# Patient Record
Sex: FEMALE | HISPANIC OR LATINO | URBAN - METROPOLITAN AREA
[De-identification: names, ages, dates, MRNs, and addresses within clinical notes are randomized per-mention and may not be internally consistent; named-entity substitution may affect disease eponyms.]

---

## 2023-11-08 ENCOUNTER — OFFICE VISIT (OUTPATIENT)
Dept: URBAN - METROPOLITAN AREA CLINIC 92 | Facility: CLINIC | Age: 48
End: 2023-11-08
Payer: COMMERCIAL

## 2023-11-08 ENCOUNTER — DASHBOARD ENCOUNTERS (OUTPATIENT)
Age: 48
End: 2023-11-08

## 2023-11-08 VITALS
HEIGHT: 59 IN | SYSTOLIC BLOOD PRESSURE: 107 MMHG | HEART RATE: 81 BPM | TEMPERATURE: 96.1 F | DIASTOLIC BLOOD PRESSURE: 65 MMHG | WEIGHT: 108 LBS | BODY MASS INDEX: 21.77 KG/M2

## 2023-11-08 DIAGNOSIS — Z12.11 COLON CANCER SCREENING: ICD-10-CM

## 2023-11-08 DIAGNOSIS — A04.8 HELICOBACTER PYLORI (H. PYLORI) INFECTION: ICD-10-CM

## 2023-11-08 PROCEDURE — 99204 OFFICE O/P NEW MOD 45 MIN: CPT | Performed by: INTERNAL MEDICINE

## 2023-11-08 PROCEDURE — 99244 OFF/OP CNSLTJ NEW/EST MOD 40: CPT | Performed by: INTERNAL MEDICINE

## 2023-11-08 RX ORDER — METFORMIN HCL 1000 MG/1
TAKE ONE TABLET BY MOUTH TWICE A DAY TABLET, FILM COATED ORAL
Qty: 180 UNSPECIFIED | Refills: 0 | Status: ACTIVE | COMMUNITY

## 2023-11-08 RX ORDER — MISOPROSTOL 200 UG/1
TAKE TWO TABLETS BY MOUTH ONE TIME DAILY FOR ONE DAY AS DIRECTED TABLET ORAL
Qty: 2 UNSPECIFIED | Refills: 0 | Status: ACTIVE | COMMUNITY

## 2023-11-08 RX ORDER — LISINOPRIL 10 MG/1
TAKE ONE TABLET BY MOUTH EVERY MORNING TABLET ORAL
Qty: 90 UNSPECIFIED | Refills: 0 | Status: ACTIVE | COMMUNITY

## 2023-11-08 RX ORDER — OMEPRAZOLE 20 MG/1
TAKE ONE CAPSULE BY MOUTH EVERY MORNING 30 MINUTES BEFORE BREAKFAST CAPSULE, DELAYED RELEASE ORAL
Qty: 90 UNSPECIFIED | Refills: 0 | Status: ACTIVE | COMMUNITY

## 2023-11-08 RX ORDER — ERGOCALCIFEROL 1.25 MG/1
TAKE ONE CAPSULE BY MOUTH EVERY WEEK CAPSULE, LIQUID FILLED ORAL
Qty: 13 UNSPECIFIED | Refills: 0 | Status: ACTIVE | COMMUNITY

## 2023-11-08 RX ORDER — DULAGLUTIDE 1.5 MG/.5ML
INJECT 0.5 ML UNDER THE SKIN EVERY A WEEK INJECTION, SOLUTION SUBCUTANEOUS
Qty: 2 MILLILITER | Refills: 0 | Status: ACTIVE | COMMUNITY

## 2023-11-08 RX ORDER — ATORVASTATIN CALCIUM, FILM COATED 40 MG/1
TAKE ONE TABLET BY MOUTH EVERY EVENING TABLET ORAL
Qty: 90 UNSPECIFIED | Refills: 0 | Status: ACTIVE | COMMUNITY

## 2023-11-08 RX ORDER — DAPAGLIFLOZIN 5 MG/1
TAKE 1 TABLET EVERY DAY BY ORAL ROUTE IN THE MORNING TABLET, FILM COATED ORAL
Qty: 90 EACH | Refills: 0 | Status: ACTIVE | COMMUNITY

## 2023-11-08 RX ORDER — GLIMEPIRIDE 2 MG/1
TAKE ONE TABLET BY MOUTH TWICE A DAY BEFORE MEALS TABLET ORAL
Qty: 180 UNSPECIFIED | Refills: 0 | Status: ACTIVE | COMMUNITY

## 2023-11-08 RX ORDER — LISINOPRIL 20 MG/1
TAKE ONE TABLET BY MOUTH EVERY MORNING TABLET ORAL
Qty: 90 UNSPECIFIED | Refills: 0 | Status: ACTIVE | COMMUNITY

## 2023-11-08 NOTE — PHYSICAL EXAM CARDIOVASCULAR:
no edema, no murmurs, regular rate and rhythm show Pt received reclining in bed, bedrails x 4, CBWR, tele. Pt agreeable to PT evaluation

## 2023-11-08 NOTE — HPI-TODAY'S VISIT:
This is a 48-year-old who presents today for evaluation of resistant or recurrent H. pylori.  She has been treated at least 3 times.  She has improved although persistent epigastric abdominal pain with burning.  She is compliant with her antibiotics and takes the full course. she does have constipation with a bowel movement every 2 to 3 days.  Colon cancer screening.  Patient has no particular gastrointestinal complaints at this time.  No prior colonoscopy

## 2024-03-04 ENCOUNTER — COL/EGD (OUTPATIENT)
Dept: URBAN - METROPOLITAN AREA MEDICAL CENTER 29 | Facility: MEDICAL CENTER | Age: 49
End: 2024-03-04
Payer: COMMERCIAL

## 2024-03-04 DIAGNOSIS — K29.60 ADENOPAPILLOMATOSIS GASTRICA: ICD-10-CM

## 2024-03-04 DIAGNOSIS — Z12.11 COLON CANCER SCREENING: ICD-10-CM

## 2024-03-04 DIAGNOSIS — K63.5 BENIGN COLON POLYP: ICD-10-CM

## 2024-03-04 DIAGNOSIS — B96.81 BACTERIAL INFECTION DUE TO H. PYLORI: ICD-10-CM

## 2024-03-04 PROCEDURE — 45380 COLONOSCOPY AND BIOPSY: CPT | Performed by: INTERNAL MEDICINE

## 2024-03-04 PROCEDURE — 43239 EGD BIOPSY SINGLE/MULTIPLE: CPT | Performed by: INTERNAL MEDICINE

## 2024-03-04 RX ORDER — MISOPROSTOL 200 UG/1
TAKE TWO TABLETS BY MOUTH ONE TIME DAILY FOR ONE DAY AS DIRECTED TABLET ORAL
Qty: 2 UNSPECIFIED | Refills: 0 | Status: ACTIVE | COMMUNITY

## 2024-03-04 RX ORDER — OMEPRAZOLE 20 MG/1
TAKE ONE CAPSULE BY MOUTH EVERY MORNING 30 MINUTES BEFORE BREAKFAST CAPSULE, DELAYED RELEASE ORAL
Qty: 90 UNSPECIFIED | Refills: 0 | Status: ACTIVE | COMMUNITY

## 2024-03-04 RX ORDER — DAPAGLIFLOZIN 5 MG/1
TAKE 1 TABLET EVERY DAY BY ORAL ROUTE IN THE MORNING TABLET, FILM COATED ORAL
Qty: 90 EACH | Refills: 0 | Status: ACTIVE | COMMUNITY

## 2024-03-04 RX ORDER — ERGOCALCIFEROL 1.25 MG/1
TAKE ONE CAPSULE BY MOUTH EVERY WEEK CAPSULE, LIQUID FILLED ORAL
Qty: 13 UNSPECIFIED | Refills: 0 | Status: ACTIVE | COMMUNITY

## 2024-03-04 RX ORDER — ATORVASTATIN CALCIUM, FILM COATED 40 MG/1
TAKE ONE TABLET BY MOUTH EVERY EVENING TABLET ORAL
Qty: 90 UNSPECIFIED | Refills: 0 | Status: ACTIVE | COMMUNITY

## 2024-03-04 RX ORDER — GLIMEPIRIDE 2 MG/1
TAKE ONE TABLET BY MOUTH TWICE A DAY BEFORE MEALS TABLET ORAL
Qty: 180 UNSPECIFIED | Refills: 0 | Status: ACTIVE | COMMUNITY

## 2024-03-04 RX ORDER — LISINOPRIL 10 MG/1
TAKE ONE TABLET BY MOUTH EVERY MORNING TABLET ORAL
Qty: 90 UNSPECIFIED | Refills: 0 | Status: ACTIVE | COMMUNITY

## 2024-03-04 RX ORDER — LISINOPRIL 20 MG/1
TAKE ONE TABLET BY MOUTH EVERY MORNING TABLET ORAL
Qty: 90 UNSPECIFIED | Refills: 0 | Status: ACTIVE | COMMUNITY

## 2024-03-04 RX ORDER — DULAGLUTIDE 1.5 MG/.5ML
INJECT 0.5 ML UNDER THE SKIN EVERY A WEEK INJECTION, SOLUTION SUBCUTANEOUS
Qty: 2 MILLILITER | Refills: 0 | Status: ACTIVE | COMMUNITY

## 2024-03-04 RX ORDER — METFORMIN HCL 1000 MG/1
TAKE ONE TABLET BY MOUTH TWICE A DAY TABLET, FILM COATED ORAL
Qty: 180 UNSPECIFIED | Refills: 0 | Status: ACTIVE | COMMUNITY